# Patient Record
(demographics unavailable — no encounter records)

---

## 2024-10-20 NOTE — PLAN
[FreeTextEntry1] :  Pulmonary bilateral pulmonary nodules Rx given to repeat CT chest w/o contrast  Cardiology hyperlipidemia - advised low cholesterol/low fat diet - check FLP-c/w  Atorvastatin 10 mg qd hypertriglyceridemia - advised low cholesterol/low fat and low carbohydrate/low sugar diet - check FLP Endocrinology hyperglycemia - advised low carbohydrate/low sugar diet; continue CV exercise - check hemoglobin A1C Hematology chronic thrombocytosis - follow up with hematologist, check CBC c/w baby Aspirin 81mg (w/ enteric coating) p.o.q.d. Gastroenterology GERD - discussed antireflux measures and weight loss f/u 5/2025 for next colonoscopy Immunization COVID- 19 - patient prefers to defer getting the new vaccination at this time   check EKG (results as above), female panel, hemoglobin A1C, Vitamin A, Vitamin B1, Vitamin B12, Serum Potassium, Serum Magnesium, and Total Serum Iron

## 2024-10-20 NOTE — ADDENDUM
[FreeTextEntry1] : I, Mame Jacobson, acted solely as scribe for Dr. Juliano Davis DO on this date 10/03/2023  10:16AM .  All medical record entries made by the Scribe were at my, Dr. Juliano Davis DO direction and personally dictated by me on 10/03/2023  10:16AM. I have reviewed the chart and agree that the record accurately reflects my personal performance of the history, physical exam, assessment and plan. I have also personally directed, reviewed and agreed with the chart.

## 2024-10-20 NOTE — REVIEW OF SYSTEMS
[Shortness Of Breath] : no shortness of breath [Wheezing] : no wheezing [Cough] : no cough [Dyspnea on Exertion] : no dyspnea on exertion [Abdominal Pain] : no abdominal pain [Memory Loss] : no memory loss [Anxiety] : anxiety [Negative] : Heme/Lymph [de-identified] : increased stress

## 2024-10-20 NOTE — ASSESSMENT
[FreeTextEntry1] : Patient is a 49 year old female with a past medical history as above who presents for an annual wellness visit.

## 2024-10-20 NOTE — HEALTH RISK ASSESSMENT
[Yes] : Yes [Monthly or less (1 pt)] : Monthly or less (1 point) [1 or 2 (0 pts)] : 1 or 2 (0 points) [Never (0 pts)] : Never (0 points) [No] : In the past 12 months have you used drugs other than those required for medical reasons? No [0] : 2) Feeling down, depressed, or hopeless: Not at all (0) [PHQ-2 Negative - No further assessment needed] : PHQ-2 Negative - No further assessment needed [Audit-CScore] : 1 [de-identified] : Denies. [MBX0Aspoc] : 0 [Former] : Former [MammogramDate] : 02/23 [MammogramComments] : Mammogram/Breast US: BI-RADS 1: Negative. [PapSmearDate] : 11/22 [PapSmearComments] : NILM.  [ColonoscopyDate] : 05/22 [ColonoscopyComments] : 3 diminutive rectal polyps; small internal hemorrhoids. f/u in 5 yrs

## 2024-10-20 NOTE — PHYSICAL EXAM
[No Acute Distress] : no acute distress [Well Nourished] : well nourished [Well Developed] : well developed [Well-Appearing] : well-appearing [Normal Voice/Communication] : normal voice/communication [Normal Sclera/Conjunctiva] : normal sclera/conjunctiva [PERRL] : pupils equal round and reactive to light [EOMI] : extraocular movements intact [Normal Outer Ear/Nose] : the outer ears and nose were normal in appearance [Normal Oropharynx] : the oropharynx was normal [Normal TMs] : both tympanic membranes were normal [Normal Nasal Mucosa] : the nasal mucosa was normal [No JVD] : no jugular venous distention [No Lymphadenopathy] : no lymphadenopathy [Supple] : supple [Thyroid Normal, No Nodules] : the thyroid was normal and there were no nodules present [No Respiratory Distress] : no respiratory distress  [No Accessory Muscle Use] : no accessory muscle use [Clear to Auscultation] : lungs were clear to auscultation bilaterally [Normal Rate] : normal rate  [Regular Rhythm] : with a regular rhythm [Normal S1, S2] : normal S1 and S2 [No Murmur] : no murmur heard [No Carotid Bruits] : no carotid bruits [No Abdominal Bruit] : a ~M bruit was not heard ~T in the abdomen [No Varicosities] : no varicosities [Pedal Pulses Present] : the pedal pulses are present [No Edema] : there was no peripheral edema [No Palpable Aorta] : no palpable aorta [No Extremity Clubbing/Cyanosis] : no extremity clubbing/cyanosis [Normal] : no carotid or abdominal bruits heard, no varicosities, pedal pulses are present, no peripheral edema, no extremity clubbing or cyanosis and no palpable aorta [Soft] : abdomen soft [Non Tender] : non-tender [Non-distended] : non-distended [No Masses] : no abdominal mass palpated [No HSM] : no HSM [Normal Bowel Sounds] : normal bowel sounds [No Hernias] : no hernias [Normal Supraclavicular Nodes] : no supraclavicular lymphadenopathy [Normal Posterior Cervical Nodes] : no posterior cervical lymphadenopathy [Normal Anterior Cervical Nodes] : no anterior cervical lymphadenopathy [No CVA Tenderness] : no CVA  tenderness [No Spinal Tenderness] : no spinal tenderness [Kyphosis] : no kyphosis [No Joint Swelling] : no joint swelling [Scoliosis] : no scoliosis [Grossly Normal Strength/Tone] : grossly normal strength/tone [No Rash] : no rash [Acne] : no acne [Coordination Grossly Intact] : coordination grossly intact [No Focal Deficits] : no focal deficits [Normal Gait] : normal gait [Deep Tendon Reflexes (DTR)] : deep tendon reflexes were 2+ and symmetric [Speech Grossly Normal] : speech grossly normal [Memory Grossly Normal] : memory grossly normal [Normal Affect] : the affect was normal [Alert and Oriented x3] : oriented to person, place, and time [Normal Mood] : the mood was normal [Normal Insight/Judgement] : insight and judgment were intact [de-identified] : done by GYN [de-identified] : overweight  [FreeTextEntry1] : done by GYN/GI

## 2024-10-20 NOTE — HISTORY OF PRESENT ILLNESS
[FreeTextEntry1] : annual wellness visit thrombocytosis fatigue constipation pulmonary nodules [de-identified] : Patient is a 49 year old female with a past medical history as below who presents for an annual wellness visit. Patient is not regularly taking any prescription medications. She is taking an OTC Multivitamin. Patient last saw GYN, Dr. Pemberton in 2024 for her annual visit. Her last breast imaging (Mammogram/Breast US) was in 2024; negative. Patient had a screening colonoscopy in May 2022 with gastroenterologist, Dr. Quach. Her next colonoscopy was recommended for 5/2025. Patient receives the flu vaccine annually and got it 10/2024.. She received the Tdap (Adacel) Vaccine on 5/28/20. Patient is vaccinated against COVID-19 (3 doses).    Patient continues to take Atorvastatin 10mg qd.  Patient was sent for CT for the heart and calcium score test (4/19/2023) to assess her coronary arteries; calcium score was found to be 0 and CT scan noted bilateral pulmonary nodules - 4 mm left lower lobe goundglass nodule, 2 mm right middle love groundglass nodule, and 6mm right upper love goundglass nodule. She will be given RX for follow up CT chest w/o contrast.  Patient has a h/o elevated platelet count and was seen in consultation by hematologist (Dr. Leiva[facility])- was advised to start baby aspirin.

## 2025-02-10 NOTE — END OF VISIT
[FreeTextEntry3] : I, Anna Cowan, acted solely as scribe for Dr. Juliano Davis DO on this date 02/10/2025.  All medical record entries made by the Scribe were at my, Dr. Juliano Davis DO direction and personally dictated by me on 02/10/2025, I have reviewed the chart and agree that the record accurately reflects my personal performance of the history, physical exam, assessment and plan. I have also personally directed, reviewed and agreed with the chart.

## 2025-02-10 NOTE — HISTORY OF PRESENT ILLNESS
[FreeTextEntry1] : Follow up for HLD fasting blood work thrombocytosis fatigue [de-identified] : The patient is a 49-year-old female past medical history as below who presents for follow-up of hyperlipidemia, fasting blood work, fatigue and thrombocytosis.  The patient notes being perimenopausal with less frequent menstrual cycles. She notes continued fatigue despite exercising and trying to maintain a healthy diet.  She does a significant amount of walking and resistance training in her exercise program.  She generally eats healthy meals prepared at home and rarely eats out at restaurants.  She continues to take atorvastatin 10 mg daily for hyperlipidemia. She was advised to increase dosage of atorvastatin 20 mg, but she declined and wanted to control cholesterol levels by diet and exercise. Her last lipid profile (10/16/2024) revealed elevated LDL 122mg/dL and low HDL 43mg/dL. She denies diffuse muscle aches or joint aches since starting the medication.  She is fasting today to have her lipid panel and liver function tests checked.  She has had recent thrombocytosis which has been assessed by hematologist, Dr. Mckeon.

## 2025-02-10 NOTE — ASSESSMENT
[FreeTextEntry1] : HEENA LUIS is a 49-year-old F with a past medical history as above who presents for follow up for HLD, fasting blood work and thrombocytosis.

## 2025-02-10 NOTE — HISTORY OF PRESENT ILLNESS
[FreeTextEntry1] : Follow up for HLD fasting blood work thrombocytosis fatigue [de-identified] : The patient is a 49-year-old female past medical history as below who presents for follow-up of hyperlipidemia, fasting blood work, fatigue and thrombocytosis.  The patient notes being perimenopausal with less frequent menstrual cycles. She notes continued fatigue despite exercising and trying to maintain a healthy diet.  She does a significant amount of walking and resistance training in her exercise program.  She generally eats healthy meals prepared at home and rarely eats out at restaurants.  She continues to take atorvastatin 10 mg daily for hyperlipidemia. She was advised to increase dosage of atorvastatin 20 mg, but she declined and wanted to control cholesterol levels by diet and exercise. Her last lipid profile (10/16/2024) revealed elevated LDL 122mg/dL and low HDL 43mg/dL. She denies diffuse muscle aches or joint aches since starting the medication.  She is fasting today to have her lipid panel and liver function tests checked.  She has had recent thrombocytosis which has been assessed by hematologist, Dr. Mckeon.

## 2025-02-10 NOTE — PLAN
[FreeTextEntry1] : Cardiology Hyperlipidemia-continue with a atorvastatin 10 mg daily-check lipid profile and liver function tests, continue with low-fat and cholesterol diet Hematology Thrombocytosis-check CBC, follow-up with hematologist, Dr. Mckeon Miscellaneous Chronic fatigue- possibly secondary to perimenopause-check vitamin and mineral panel, f/u GYN  check pending labs- CBC, Lipid profile, LFT, CMP

## 2025-02-10 NOTE — PHYSICAL EXAM
[No Acute Distress] : no acute distress [Well Nourished] : well nourished [Well Developed] : well developed [Well-Appearing] : well-appearing [Normal Voice/Communication] : normal voice/communication [Normal Sclera/Conjunctiva] : normal sclera/conjunctiva [PERRL] : pupils equal round and reactive to light [EOMI] : extraocular movements intact [Normal Outer Ear/Nose] : the outer ears and nose were normal in appearance [Normal Oropharynx] : the oropharynx was normal [Normal TMs] : both tympanic membranes were normal [Normal Nasal Mucosa] : the nasal mucosa was normal [No JVD] : no jugular venous distention [No Lymphadenopathy] : no lymphadenopathy [Supple] : supple [Thyroid Normal, No Nodules] : the thyroid was normal and there were no nodules present [No Respiratory Distress] : no respiratory distress  [No Accessory Muscle Use] : no accessory muscle use [Clear to Auscultation] : lungs were clear to auscultation bilaterally [Normal Rate] : normal rate  [Regular Rhythm] : with a regular rhythm [Normal S1, S2] : normal S1 and S2 [No Murmur] : no murmur heard [No Carotid Bruits] : no carotid bruits [No Abdominal Bruit] : a ~M bruit was not heard ~T in the abdomen [No Varicosities] : no varicosities [Pedal Pulses Present] : the pedal pulses are present [No Edema] : there was no peripheral edema [No Palpable Aorta] : no palpable aorta [Soft] : abdomen soft [Non Tender] : non-tender [Non-distended] : non-distended [No Masses] : no abdominal mass palpated [No HSM] : no HSM [Normal Bowel Sounds] : normal bowel sounds [Normal Supraclavicular Nodes] : no supraclavicular lymphadenopathy [Normal Posterior Cervical Nodes] : no posterior cervical lymphadenopathy [Normal Anterior Cervical Nodes] : no anterior cervical lymphadenopathy [No CVA Tenderness] : no CVA  tenderness [No Spinal Tenderness] : no spinal tenderness [No Joint Swelling] : no joint swelling [Grossly Normal Strength/Tone] : grossly normal strength/tone [No Rash] : no rash [Coordination Grossly Intact] : coordination grossly intact [No Focal Deficits] : no focal deficits [Normal Gait] : normal gait [Deep Tendon Reflexes (DTR)] : deep tendon reflexes were 2+ and symmetric [Speech Grossly Normal] : speech grossly normal [Memory Grossly Normal] : memory grossly normal [Normal Affect] : the affect was normal [Alert and Oriented x3] : oriented to person, place, and time [Normal Mood] : the mood was normal [Normal Insight/Judgement] : insight and judgment were intact [Kyphosis] : no kyphosis [Scoliosis] : no scoliosis [Acne] : no acne

## 2025-03-26 NOTE — PLAN
[FreeTextEntry1] : Cardiology Hyperlipidemia-continue with a atorvastatin 10 mg daily-check lipid profile and liver function tests at next visit, continue with low-fat and cholesterol diet Hematology Thrombocytosis-check CBC, follow-up with hematologist, Dr. Mckeon Psychiatry anxiety- c/w Alprazolam 0.5mg p.o PRN GYN abnormal menses/perimenopause-has follow up appt with GYN tomorrow where she will get pelvic US to assess menorrhagia. Her vague symptoms of fatigue and dizziness appear to be related to perimenopause-check CBC and CMP, f/u GYN (Dr. Daria Pemberton) Immunization Shingrix- discussed the benefit of receiving secondary to age and past h/o chicken pox. Patient understands well, will contact her insurance for clearance and pending blood work for assessing the titers, she would be interested in acquiring the vaccine either at the office or her local pharmacy.   check pending labs- CBC, CMP, MMR and Varicella IgG

## 2025-03-26 NOTE — HISTORY OF PRESENT ILLNESS
[FreeTextEntry1] : menorrhagia  fatigue perimenopause [de-identified] : The patient is a 50-year-old female past medical history as below who presents for follow-up of menorrhagia, perimenopause and fatigue. Patient reports of recent travel to [place] and returning from the trip. She complains of h/o amenorrhea in the past three months and last week she got her menses, the flow was unusually heavier than her regular menstrual cycle. She also reports of an episode of dizziness, perspiration in the last week and she felt her balance was becoming abnormal secondary to menorrhagia. The patient notes being perimenopausal with less frequent menstrual cycles. She notes continued fatigue despite exercising and trying to maintain a healthy diet.  She does a significant amount of walking and resistance training in her exercise program outside of the winter months when she is more sedentary.  She generally eats healthy meals prepared at home and rarely eats out at restaurants.  She continues to take atorvastatin 10 mg daily for hyperlipidemia. She was advised to increase dosage of atorvastatin to 20 mg, but she declined and wanted to control cholesterol levels by diet and exercise. Her last lipid profile (10/16/2024) revealed elevated LDL 122mg/dL and low HDL 43mg/dL. She denies diffuse muscle aches or joint aches since starting the medication. Patient was prescribed hydrochlorothiazide to take on a prn basis for fluid retention, but she denies taking it.  Patient inquiries about shingles vaccine secondary to her past h/o chicken pox. She would like her MMR and Varicella zoster titers to be checked.

## 2025-03-26 NOTE — END OF VISIT
[FreeTextEntry3] : I, Anna Cowan, acted solely as scribe for Dr. Juliano Davis DO on this date 03/26/2025.   All medical record entries made by the Scribe were at my, Dr. Juliano Davis DO direction and personally dictated by me on 03/26/2025, I have reviewed the chart and agree that the record accurately reflects my personal performance of the history, physical exam, assessment and plan. I have also personally directed, reviewed and agreed with the chart.     [Time Spent: ___ minutes] : I have spent [unfilled] minutes of time on the encounter which excludes teaching and separately reported services.

## 2025-03-26 NOTE — ASSESSMENT
[FreeTextEntry1] : The patient is a 50-year-old female past medical history as above who presents for follow-up of menorrhagia, perimenopause and fatigue.

## 2025-03-26 NOTE — HISTORY OF PRESENT ILLNESS
[FreeTextEntry1] : menorrhagia  fatigue perimenopause [de-identified] : The patient is a 50-year-old female past medical history as below who presents for follow-up of menorrhagia, perimenopause and fatigue. Patient reports of recent travel to [place] and returning from the trip. She complains of h/o amenorrhea in the past three months and last week she got her menses, the flow was unusually heavier than her regular menstrual cycle. She also reports of an episode of dizziness, perspiration in the last week and she felt her balance was becoming abnormal secondary to menorrhagia. The patient notes being perimenopausal with less frequent menstrual cycles. She notes continued fatigue despite exercising and trying to maintain a healthy diet.  She does a significant amount of walking and resistance training in her exercise program outside of the winter months when she is more sedentary.  She generally eats healthy meals prepared at home and rarely eats out at restaurants.  She continues to take atorvastatin 10 mg daily for hyperlipidemia. She was advised to increase dosage of atorvastatin to 20 mg, but she declined and wanted to control cholesterol levels by diet and exercise. Her last lipid profile (10/16/2024) revealed elevated LDL 122mg/dL and low HDL 43mg/dL. She denies diffuse muscle aches or joint aches since starting the medication. Patient was prescribed hydrochlorothiazide to take on a prn basis for fluid retention, but she denies taking it.  Patient inquiries about shingles vaccine secondary to her past h/o chicken pox. She would like her MMR and Varicella zoster titers to be checked.

## 2025-03-26 NOTE — PHYSICAL EXAM
[No Acute Distress] : no acute distress [Well Nourished] : well nourished [Well Developed] : well developed [Well-Appearing] : well-appearing [Normal Voice/Communication] : normal voice/communication [Normal Sclera/Conjunctiva] : normal sclera/conjunctiva [PERRL] : pupils equal round and reactive to light [EOMI] : extraocular movements intact [Normal Outer Ear/Nose] : the outer ears and nose were normal in appearance [Normal Oropharynx] : the oropharynx was normal [Normal TMs] : both tympanic membranes were normal [Normal Nasal Mucosa] : the nasal mucosa was normal [No JVD] : no jugular venous distention [No Lymphadenopathy] : no lymphadenopathy [Supple] : supple [Thyroid Normal, No Nodules] : the thyroid was normal and there were no nodules present [No Respiratory Distress] : no respiratory distress  [No Accessory Muscle Use] : no accessory muscle use [Clear to Auscultation] : lungs were clear to auscultation bilaterally [Normal Rate] : normal rate  [Regular Rhythm] : with a regular rhythm [Normal S1, S2] : normal S1 and S2 [No Murmur] : no murmur heard [No Carotid Bruits] : no carotid bruits [No Abdominal Bruit] : a ~M bruit was not heard ~T in the abdomen [No Varicosities] : no varicosities [Pedal Pulses Present] : the pedal pulses are present [No Edema] : there was no peripheral edema [No Palpable Aorta] : no palpable aorta [Soft] : abdomen soft [Non Tender] : non-tender [Non-distended] : non-distended [No Masses] : no abdominal mass palpated [No HSM] : no HSM [Normal Bowel Sounds] : normal bowel sounds [Normal Supraclavicular Nodes] : no supraclavicular lymphadenopathy [Normal Posterior Cervical Nodes] : no posterior cervical lymphadenopathy [Normal Anterior Cervical Nodes] : no anterior cervical lymphadenopathy [No CVA Tenderness] : no CVA  tenderness [No Spinal Tenderness] : no spinal tenderness [No Joint Swelling] : no joint swelling [Grossly Normal Strength/Tone] : grossly normal strength/tone [No Rash] : no rash [Coordination Grossly Intact] : coordination grossly intact [No Focal Deficits] : no focal deficits [Normal Gait] : normal gait [Deep Tendon Reflexes (DTR)] : deep tendon reflexes were 2+ and symmetric [Speech Grossly Normal] : speech grossly normal [Memory Grossly Normal] : memory grossly normal [Normal Affect] : the affect was normal [Alert and Oriented x3] : oriented to person, place, and time [Normal Mood] : the mood was normal [Normal Insight/Judgement] : insight and judgment were intact [Kyphosis] : no kyphosis [Scoliosis] : no scoliosis [Acne] : no acne [de-identified] : overweight

## 2025-03-26 NOTE — PHYSICAL EXAM
[No Acute Distress] : no acute distress [Well Nourished] : well nourished [Well Developed] : well developed [Well-Appearing] : well-appearing [Normal Voice/Communication] : normal voice/communication [Normal Sclera/Conjunctiva] : normal sclera/conjunctiva [PERRL] : pupils equal round and reactive to light [EOMI] : extraocular movements intact [Normal Outer Ear/Nose] : the outer ears and nose were normal in appearance [Normal Oropharynx] : the oropharynx was normal [Normal TMs] : both tympanic membranes were normal [Normal Nasal Mucosa] : the nasal mucosa was normal [No JVD] : no jugular venous distention [No Lymphadenopathy] : no lymphadenopathy [Supple] : supple [Thyroid Normal, No Nodules] : the thyroid was normal and there were no nodules present [No Respiratory Distress] : no respiratory distress  [No Accessory Muscle Use] : no accessory muscle use [Clear to Auscultation] : lungs were clear to auscultation bilaterally [Normal Rate] : normal rate  [Regular Rhythm] : with a regular rhythm [Normal S1, S2] : normal S1 and S2 [No Murmur] : no murmur heard [No Carotid Bruits] : no carotid bruits [No Abdominal Bruit] : a ~M bruit was not heard ~T in the abdomen [No Varicosities] : no varicosities [Pedal Pulses Present] : the pedal pulses are present [No Edema] : there was no peripheral edema [No Palpable Aorta] : no palpable aorta [Soft] : abdomen soft [Non Tender] : non-tender [Non-distended] : non-distended [No Masses] : no abdominal mass palpated [No HSM] : no HSM [Normal Bowel Sounds] : normal bowel sounds [Normal Supraclavicular Nodes] : no supraclavicular lymphadenopathy [Normal Posterior Cervical Nodes] : no posterior cervical lymphadenopathy [Normal Anterior Cervical Nodes] : no anterior cervical lymphadenopathy [No CVA Tenderness] : no CVA  tenderness [No Spinal Tenderness] : no spinal tenderness [No Joint Swelling] : no joint swelling [Grossly Normal Strength/Tone] : grossly normal strength/tone [No Rash] : no rash [Coordination Grossly Intact] : coordination grossly intact [No Focal Deficits] : no focal deficits [Normal Gait] : normal gait [Deep Tendon Reflexes (DTR)] : deep tendon reflexes were 2+ and symmetric [Speech Grossly Normal] : speech grossly normal [Memory Grossly Normal] : memory grossly normal [Normal Affect] : the affect was normal [Alert and Oriented x3] : oriented to person, place, and time [Normal Mood] : the mood was normal [Normal Insight/Judgement] : insight and judgment were intact [Kyphosis] : no kyphosis [Scoliosis] : no scoliosis [Acne] : no acne [de-identified] : overweight

## 2025-06-23 NOTE — HISTORY OF PRESENT ILLNESS
[Current] : current [TextBox_4] : 50F with HLD, social smoker here to fu lung nodules  no pulm hx no cancer hx smoked about 1/2 ppd in the past now only socially/on vacations  has small GGO nodules on ct chest no sob/cough/wheeze

## 2025-06-23 NOTE — PROCEDURE
[FreeTextEntry1] :  PFT: Normal spirometry.  Lung volumes normal. DLCO normal.  reviewed:  EXAM: [id number] - CT CHEST  - ORDERED BY: AARON SCOTT   PROCEDURE DATE:  12/04/2024    INTERPRETATION:  CLINICAL INFORMATION: 49-year-old female presented for lung nodule follow-up "who"lung  TECHNIQUE: A volumetric CT acquisition of the chest was obtained from the thoracic inlet to the upper abdomen, without administration of intravenous contrast. This CT scan was performed with one or more of the following dose optimization: iterative reconstruction, automatic exposure control, and/or manual adjustment of mAs and kVp according to the patient's size. 3D MIP and volume-rendered images were created at the scanner.  COMPARISON: The study was compared to CT chest dated 11/15/2023 and CT heart calcium score 4/19/2023  FINDINGS: Lines and Tubes: None  Mediastinum: The thyroid and thoracic inlet are normal. There is no enlarged axiliary, mediastinal, or hilar lymph nodes. The heart size is within normal limits. There is no pericardial effusion. The aorta is normal in course and caliber, with no significant calcified atheromas. The esophagus is unremarkable.  Lung: The central tracheobronchial tree is patent. There is no focal consolidation. There is a 7 mm groundglass nodule in right upper lobe (series 4, image 196), unchanged. There is a stable 5 mm groundglass nodule in left upper lobe (series 4, image 117). There is 4 mm groundglass nodule in left lower lobe (series 4, image 319), unchanged.  Pleura: There is no pleural effusion or pneumothorax.  Abdomen: Limited evaluation of liver, spleen, pancreas, adrenal glands, and upper pole of kidneys is unremarkable.  Bone and Soft Tissue: There is no evidence of osteosclerotic or osteolytic lesions. The soft tissue is grossly normal.  IMPRESSION: Redemonstration of groundglass nodules in bilateral lungs, indeterminant. slow-growing lung malignancy cannot be excluded. Continued surveillance is recommended.  --- End of Report ---       CHRISS COATES MD; Attending Radiologist This document has been electronically signed. Dec  9 2024 10:16AM